# Patient Record
Sex: MALE | Race: WHITE | Employment: FULL TIME | URBAN - METROPOLITAN AREA
[De-identification: names, ages, dates, MRNs, and addresses within clinical notes are randomized per-mention and may not be internally consistent; named-entity substitution may affect disease eponyms.]

---

## 2017-05-09 ENCOUNTER — OFFICE VISIT (OUTPATIENT)
Dept: FAMILY MEDICINE CLINIC | Facility: CLINIC | Age: 42
End: 2017-05-09

## 2017-05-09 VITALS
WEIGHT: 144 LBS | RESPIRATION RATE: 16 BRPM | BODY MASS INDEX: 21.82 KG/M2 | HEIGHT: 68 IN | HEART RATE: 72 BPM | DIASTOLIC BLOOD PRESSURE: 72 MMHG | TEMPERATURE: 98 F | SYSTOLIC BLOOD PRESSURE: 118 MMHG

## 2017-05-09 DIAGNOSIS — J06.9 ACUTE URI: ICD-10-CM

## 2017-05-09 PROCEDURE — 99213 OFFICE O/P EST LOW 20 MIN: CPT | Performed by: FAMILY MEDICINE

## 2017-05-09 PROCEDURE — 99212 OFFICE O/P EST SF 10 MIN: CPT | Performed by: FAMILY MEDICINE

## 2017-05-09 RX ORDER — AZITHROMYCIN 250 MG/1
TABLET, FILM COATED ORAL
Qty: 6 TABLET | Refills: 0 | Status: SHIPPED | OUTPATIENT
Start: 2017-05-09 | End: 2017-08-21

## 2017-05-09 NOTE — PROGRESS NOTES
HPI:    Patient ID: Jeff Wells is a 46year old male. HPI Comments: Pt presents with cold symptoms for 6 days. Pt has had cough, sore throat. No fevers. Pt has tried otc remedies without relief. Pt states sick contacts at home.  Pt is planning on moist.   Neck: Neck supple. Cardiovascular: Normal rate, regular rhythm, normal heart sounds and intact distal pulses. Pulmonary/Chest: Effort normal and breath sounds normal. No respiratory distress. He has no wheezes. He has no rales.    Catalino Ling

## 2017-08-21 ENCOUNTER — HOSPITAL ENCOUNTER (OUTPATIENT)
Age: 42
Discharge: HOME | End: 2017-08-21
Attending: EMERGENCY MEDICINE
Payer: COMMERCIAL

## 2017-08-21 VITALS
DIASTOLIC BLOOD PRESSURE: 62 MMHG | HEART RATE: 73 BPM | WEIGHT: 138 LBS | RESPIRATION RATE: 18 BRPM | OXYGEN SATURATION: 97 % | BODY MASS INDEX: 20.44 KG/M2 | HEIGHT: 69 IN | SYSTOLIC BLOOD PRESSURE: 104 MMHG | TEMPERATURE: 98 F

## 2017-08-21 DIAGNOSIS — R21 SKIN RASH: Primary | ICD-10-CM

## 2017-08-21 PROCEDURE — 99213 OFFICE O/P EST LOW 20 MIN: CPT

## 2017-08-21 PROCEDURE — 99214 OFFICE O/P EST MOD 30 MIN: CPT

## 2017-08-21 RX ORDER — METHYLPREDNISOLONE 4 MG/1
TABLET ORAL
Qty: 1 PACKAGE | Refills: 1 | Status: SHIPPED | OUTPATIENT
Start: 2017-08-21 | End: 2017-08-26

## 2017-08-21 RX ORDER — MELOXICAM 7.5 MG/1
TABLET ORAL
COMMUNITY
Start: 2017-07-28 | End: 2020-12-21 | Stop reason: DRUGHIGH

## 2017-08-21 NOTE — ED PROVIDER NOTES
Patient Seen in: [facility]    History   Patient presents with:  Rash Skin Problem (integumentary)    Stated Complaint: allergic reaction bilateral lower legs    HPI    This patient complains of a rash to his legs which h °F (36.7 °C)  Temp src: Oral  SpO2: 97 %  O2 Device: None (Room air)    Current:/62   Pulse 73   Temp 98.1 °F (36.7 °C) (Oral)   Resp 18   Ht 175.3 cm (5' 9\")   Wt 62.6 kg   SpO2 97%   BMI 20.38 kg/m²         Physical Exam    The patient is awake an

## 2017-09-11 ENCOUNTER — TELEPHONE (OUTPATIENT)
Dept: FAMILY MEDICINE CLINIC | Facility: CLINIC | Age: 42
End: 2017-09-11

## 2017-09-11 NOTE — TELEPHONE ENCOUNTER
Advised patient of Dr. Angela Nash note below. Patient verbalized understanding. Patient stating he canceled apt today because ortho was able to get him in sooner and he will see ortho tomorrow.

## 2017-09-11 NOTE — TELEPHONE ENCOUNTER
Pt made appt with [facility] this morning at 11:30a  Requesting callback prior to appt if [facility] can do cortisone injection at appt? Said appt with ortho not for 2 weeks.

## 2018-05-28 ENCOUNTER — HOSPITAL ENCOUNTER (OUTPATIENT)
Age: 43
Discharge: HOME | End: 2018-05-28
Attending: EMERGENCY MEDICINE
Payer: COMMERCIAL

## 2018-05-28 VITALS
HEIGHT: 69 IN | DIASTOLIC BLOOD PRESSURE: 71 MMHG | OXYGEN SATURATION: 98 % | HEART RATE: 70 BPM | TEMPERATURE: 98 F | WEIGHT: 145 LBS | SYSTOLIC BLOOD PRESSURE: 99 MMHG | RESPIRATION RATE: 20 BRPM | BODY MASS INDEX: 21.48 KG/M2

## 2018-05-28 DIAGNOSIS — J02.0 STREP PHARYNGITIS: Primary | ICD-10-CM

## 2018-05-28 PROCEDURE — 99214 OFFICE O/P EST MOD 30 MIN: CPT

## 2018-05-28 PROCEDURE — 99213 OFFICE O/P EST LOW 20 MIN: CPT

## 2018-05-28 PROCEDURE — 87430 STREP A AG IA: CPT

## 2018-05-28 RX ORDER — AMOXICILLIN 875 MG/1
875 TABLET, COATED ORAL 2 TIMES DAILY
Qty: 20 TABLET | Refills: 0 | Status: SHIPPED | OUTPATIENT
Start: 2018-05-28 | End: 2018-06-07

## 2018-05-28 NOTE — ED PROVIDER NOTES
Patient Seen in: [facility]    History   Patient presents with:  Sore Throat    Stated Complaint: sore throat    HPI    The patient  complains of a sore throat which he has had for the past day associated with left-sided there are no gross cranial nerve deficits patient moves all 4 extremities without impairment    icc  Course     Labs Reviewed   [facility] POCT RAPID STREP - Abnormal; Notable for the following:        Result Value    POCT Rapid Strep Positive (*)     All other c

## 2018-06-03 ENCOUNTER — HOSPITAL ENCOUNTER (OUTPATIENT)
Age: 43
Discharge: HOME | End: 2018-06-03
Attending: EMERGENCY MEDICINE
Payer: COMMERCIAL

## 2018-06-03 ENCOUNTER — APPOINTMENT (OUTPATIENT)
Dept: GENERAL RADIOLOGY | Age: 43
End: 2018-06-03
Attending: EMERGENCY MEDICINE
Payer: COMMERCIAL

## 2018-06-03 VITALS
WEIGHT: 140 LBS | HEIGHT: 69 IN | DIASTOLIC BLOOD PRESSURE: 72 MMHG | SYSTOLIC BLOOD PRESSURE: 104 MMHG | RESPIRATION RATE: 18 BRPM | OXYGEN SATURATION: 99 % | HEART RATE: 80 BPM | BODY MASS INDEX: 20.73 KG/M2 | TEMPERATURE: 98 F

## 2018-06-03 DIAGNOSIS — R05.9 COUGH: Primary | ICD-10-CM

## 2018-06-03 PROCEDURE — 99213 OFFICE O/P EST LOW 20 MIN: CPT

## 2018-06-03 PROCEDURE — 71046 X-RAY EXAM CHEST 2 VIEWS: CPT | Performed by: EMERGENCY MEDICINE

## 2018-06-03 PROCEDURE — 99214 OFFICE O/P EST MOD 30 MIN: CPT

## 2018-06-03 RX ORDER — CEFDINIR 300 MG/1
300 CAPSULE ORAL 2 TIMES DAILY
Qty: 20 CAPSULE | Refills: 0 | Status: SHIPPED | OUTPATIENT
Start: 2018-06-03 | End: 2018-06-13

## 2018-06-03 NOTE — ED PROVIDER NOTES
Patient Seen in: [facility]    History   Patient presents with:  Cough/URI    Stated Complaint: cough    HPI    Patient was seen here May 28  with complaint of sore throat. He had a positive rapid strep screen.   The ghislaine throat there is no pharyngeal erythema or exudate  Neck is supple without swelling  Lungs are clear to auscultation  Cardiac there are normal first and second heart sounds  Abdomen is soft and nontender  Extremities no edema  Neurologic there are no gross

## 2018-06-03 NOTE — ED INITIAL ASSESSMENT (HPI)
Pt states he was recently diagnosed with strep throat. Currently taking antibiotic. States throat feels better but has developed a dry cough with chest congestion and occasional shortness of breath. Denies fever.

## 2019-10-16 ENCOUNTER — HOSPITAL ENCOUNTER (OUTPATIENT)
Age: 44
Discharge: HOME | End: 2019-10-16
Payer: COMMERCIAL

## 2019-10-16 VITALS
RESPIRATION RATE: 20 BRPM | TEMPERATURE: 98 F | OXYGEN SATURATION: 96 % | WEIGHT: 140 LBS | DIASTOLIC BLOOD PRESSURE: 82 MMHG | HEIGHT: 69 IN | SYSTOLIC BLOOD PRESSURE: 112 MMHG | BODY MASS INDEX: 20.73 KG/M2 | HEART RATE: 65 BPM

## 2019-10-16 DIAGNOSIS — H65.92 LEFT NON-SUPPURATIVE OTITIS MEDIA: Primary | ICD-10-CM

## 2019-10-16 PROCEDURE — 99214 OFFICE O/P EST MOD 30 MIN: CPT

## 2019-10-16 PROCEDURE — 99213 OFFICE O/P EST LOW 20 MIN: CPT

## 2019-10-16 RX ORDER — AMOXICILLIN 400 MG/5ML
800 POWDER, FOR SUSPENSION ORAL 2 TIMES DAILY
Qty: 200 ML | Refills: 0 | Status: SHIPPED | OUTPATIENT
Start: 2019-10-16 | End: 2019-10-26

## 2019-10-16 NOTE — ED INITIAL ASSESSMENT (HPI)
PATIENT ARRIVED AMBULATORY TO ROOM C/O LEFT EAR DISCOMFORT. PATIENT STATES \"I'VE HAD A HOLE IN MY EAR DRUM SINCE I WAS LITTLE. JUMA HAD IT SURGICALLY REPAIRED A COUPLE OF TIMES BUT WE STOPPED DOING ANYTHING FOR IT.  I WAS AT A WATER PARK THIS WEEKEND AND GO

## 2019-10-16 NOTE — ED PROVIDER NOTES
Patient presents with:  Ear Problem      HPI:     Tom Callejas is a 45year old male who presents with a chief complaint of left ear pain for the past 3 days. Patient states he was at a water park and thinks he may have gotten some water in it.   No d Intimate partner violence:        Fear of current or ex partner: Not on file        Emotionally abused: Not on file        Physically abused: Not on file        Forced sexual activity: Not on file    Other Topics      Concerns:         Service: N Encounter      Amoxicillin 400 MG/5ML Oral Recon Susp          Sig: Take 10 mL (800 mg total) by mouth 2 (two) times daily for 10 days.           Dispense:  200 mL          Refill:  0      Labs performed this visit:  No results found for this or any previou

## 2020-12-21 ENCOUNTER — OFFICE VISIT (OUTPATIENT)
Dept: NEUROLOGY | Facility: CLINIC | Age: 45
End: 2020-12-21
Payer: COMMERCIAL

## 2020-12-21 ENCOUNTER — TELEPHONE (OUTPATIENT)
Dept: NEUROLOGY | Facility: CLINIC | Age: 45
End: 2020-12-21

## 2020-12-21 VITALS — BODY MASS INDEX: 21.03 KG/M2 | WEIGHT: 142 LBS | HEIGHT: 69 IN

## 2020-12-21 DIAGNOSIS — M25.551 RIGHT HIP PAIN: Primary | ICD-10-CM

## 2020-12-21 PROCEDURE — 99214 OFFICE O/P EST MOD 30 MIN: CPT | Performed by: PHYSICAL MEDICINE & REHABILITATION

## 2020-12-21 PROCEDURE — 3008F BODY MASS INDEX DOCD: CPT | Performed by: PHYSICAL MEDICINE & REHABILITATION

## 2020-12-21 RX ORDER — HYDROCODONE BITARTRATE AND ACETAMINOPHEN 5; 325 MG/1; MG/1
TABLET ORAL
COMMUNITY
Start: 2020-12-16 | End: 2020-12-21

## 2020-12-21 RX ORDER — HYDROCODONE BITARTRATE AND ACETAMINOPHEN 5; 325 MG/1; MG/1
1 TABLET ORAL EVERY 6 HOURS PRN
Qty: 60 TABLET | Refills: 0 | Status: SHIPPED | OUTPATIENT
Start: 2020-12-21 | End: 2021-01-05

## 2020-12-21 RX ORDER — METHYLPREDNISOLONE 4 MG/1
TABLET ORAL
COMMUNITY
Start: 2020-12-16 | End: 2021-03-17 | Stop reason: ALTCHOICE

## 2020-12-21 RX ORDER — MELOXICAM 15 MG/1
15 TABLET ORAL DAILY
Qty: 30 TABLET | Refills: 0 | Status: SHIPPED | OUTPATIENT
Start: 2020-12-21 | End: 2021-03-17

## 2020-12-21 NOTE — TELEPHONE ENCOUNTER
Contacted Stefan Lebron at [facility] # [id number] to initiate authorization for right Hip MRI Arthrogram CPT 21230 dx:M25.551  Coverage is active    Status: Approved-no authorization required per health plan    Left detailed message informing p

## 2020-12-21 NOTE — PROGRESS NOTES
[address]  Progress Note    CHIEF COMPLAINT:  Patient presents with:  Leg Pain: Former patient of Dr. Jayleen Villegas presents for pain in the right groin and leg.  Patient states that pain started about 3 weeks ago a HISTORY:   Family History   Problem Relation Age of Onset   • Other (Other[other]) Father         mesotheolioma   • Breast Cancer Father        CURRENT MEDICATIONS:   Current Outpatient Medications   Medication Sig Dispense Refill   • methylPREDNISolone 4 denies  Balance: denies   Psychiatric  Anxiety: denies  Depressed Mood: denies          All other systems reviewed and are negative. Pertinent positives and negatives noted in the HPI.         PHYSICAL EXAM:   Ht 69\"   Wt 142 lb (64.4 kg)   BMI 20.97 kg/m² There were no barriers to learning.         Aleksey Calabrese MD  Physical Medicine and Rehabilitation/Sports Medicine  [facility]

## 2020-12-22 ENCOUNTER — TELEPHONE (OUTPATIENT)
Dept: NEUROLOGY | Facility: CLINIC | Age: 45
End: 2020-12-22

## 2020-12-22 NOTE — TELEPHONE ENCOUNTER
Tried calling to initiate PA but they were having technical difficulties at this time. Will try again at a later time.

## 2020-12-23 NOTE — TELEPHONE ENCOUNTER
Called member eligibility for prior authorization on hydrocodone. Gave information over the phone. Will have turn around time of 48 hours. Unable to give expedited turn around time. Reference # [id number].      Spoke to patient, advised completed prior autho

## 2020-12-29 ENCOUNTER — HOSPITAL ENCOUNTER (OUTPATIENT)
Dept: MRI IMAGING | Facility: HOSPITAL | Age: 45
Discharge: HOME | End: 2020-12-29
Attending: PHYSICAL MEDICINE & REHABILITATION
Payer: COMMERCIAL

## 2020-12-29 ENCOUNTER — HOSPITAL ENCOUNTER (OUTPATIENT)
Dept: GENERAL RADIOLOGY | Facility: HOSPITAL | Age: 45
Discharge: HOME | End: 2020-12-29
Attending: PHYSICAL MEDICINE & REHABILITATION
Payer: COMMERCIAL

## 2020-12-29 ENCOUNTER — TELEPHONE (OUTPATIENT)
Dept: NEUROLOGY | Facility: CLINIC | Age: 45
End: 2020-12-29

## 2020-12-29 DIAGNOSIS — R93.89 ABNORMAL RADIOGRAPH: Primary | ICD-10-CM

## 2020-12-29 DIAGNOSIS — M25.551 RIGHT HIP PAIN: ICD-10-CM

## 2020-12-29 DIAGNOSIS — M53.3 SACRAL PAIN: ICD-10-CM

## 2020-12-29 PROCEDURE — 73722 MRI JOINT OF LWR EXTR W/DYE: CPT | Performed by: PHYSICAL MEDICINE & REHABILITATION

## 2020-12-29 PROCEDURE — A9575 INJ GADOTERATE MEGLUMI 0.1ML: HCPCS | Performed by: PHYSICAL MEDICINE & REHABILITATION

## 2020-12-29 PROCEDURE — 27093 INJECTION FOR HIP X-RAY: CPT | Performed by: PHYSICAL MEDICINE & REHABILITATION

## 2020-12-29 PROCEDURE — 77002 NEEDLE LOCALIZATION BY XRAY: CPT | Performed by: PHYSICAL MEDICINE & REHABILITATION

## 2020-12-29 RX ORDER — LIDOCAINE HYDROCHLORIDE 20 MG/ML
INJECTION, SOLUTION EPIDURAL; INFILTRATION; INTRACAUDAL; PERINEURAL
Status: COMPLETED
Start: 2020-12-29 | End: 2020-12-29

## 2020-12-29 RX ADMIN — LIDOCAINE HYDROCHLORIDE 5 ML: 20 INJECTION, SOLUTION EPIDURAL; INFILTRATION; INTRACAUDAL; PERINEURAL at 09:50:00

## 2020-12-29 NOTE — TELEPHONE ENCOUNTER
Pls let the patient know his hip looks normal. The radiologist saw some swelling in the pelvic bone and recommended a pelvic MRI. Stress fracture? I have put the order in and I recommend he get that.

## 2020-12-29 NOTE — TELEPHONE ENCOUNTER
Patient notified of the imaging results as documented by Dr. Billy Granado. Patient verbalized understanding and was transferred to [address] [place] [address] scheduling as the Pelvis MRI does not required authorization per health plan and can be scheduled.

## 2020-12-29 NOTE — TELEPHONE ENCOUNTER
Alvaro Harris at [facility] Case/Reference # [id number] to initiate authorization for Pelvis MRI CPT 81547 dx:R93.89+M53.3.     Status: Approved-no authorization required per plan  Subject to medical review once billed  Coverage is active    Patient n

## 2020-12-30 ENCOUNTER — HOSPITAL ENCOUNTER (OUTPATIENT)
Dept: MRI IMAGING | Age: 45
Discharge: HOME | End: 2020-12-30
Attending: PHYSICAL MEDICINE & REHABILITATION
Payer: COMMERCIAL

## 2020-12-30 ENCOUNTER — TELEPHONE (OUTPATIENT)
Dept: NEUROLOGY | Facility: CLINIC | Age: 45
End: 2020-12-30

## 2020-12-30 DIAGNOSIS — M46.1 SACROILIITIS (HCC): Primary | ICD-10-CM

## 2020-12-30 DIAGNOSIS — R93.89 ABNORMAL RADIOGRAPH: ICD-10-CM

## 2020-12-30 DIAGNOSIS — M53.3 SACRAL PAIN: ICD-10-CM

## 2020-12-30 PROCEDURE — 72195 MRI PELVIS W/O DYE: CPT | Performed by: PHYSICAL MEDICINE & REHABILITATION

## 2020-12-30 NOTE — TELEPHONE ENCOUNTER
I discussed the MRI results with the patient. His pelvic MRI shows swelling within the sacroiliac joint. There is some fluid within the joint which either indicates joint swelling or possibly infection.   This is unusual and I will order some blood tests

## 2020-12-31 ENCOUNTER — LAB ENCOUNTER (OUTPATIENT)
Dept: LAB | Age: 45
End: 2020-12-31
Attending: PHYSICAL MEDICINE & REHABILITATION
Payer: COMMERCIAL

## 2021-01-04 ENCOUNTER — LAB ENCOUNTER (OUTPATIENT)
Dept: LAB | Age: 46
End: 2021-01-04
Attending: PHYSICAL MEDICINE & REHABILITATION
Payer: COMMERCIAL

## 2021-01-04 DIAGNOSIS — M46.1 SACROILIITIS (HCC): ICD-10-CM

## 2021-01-04 LAB
BASOPHILS # BLD AUTO: 0.05 X10(3) UL (ref 0–0.2)
BASOPHILS NFR BLD AUTO: 0.9 %
CRP SERPL-MCNC: <0.29 MG/DL (ref ?–0.3)
DEPRECATED RDW RBC AUTO: 41.6 FL (ref 35.1–46.3)
EOSINOPHIL # BLD AUTO: 0.24 X10(3) UL (ref 0–0.7)
EOSINOPHIL NFR BLD AUTO: 4.2 %
ERYTHROCYTE [DISTWIDTH] IN BLOOD BY AUTOMATED COUNT: 12.6 % (ref 11–15)
ERYTHROCYTE [SEDIMENTATION RATE] IN BLOOD: 11 MM/HR
HCT VFR BLD AUTO: 45.6 %
HGB BLD-MCNC: 15 G/DL
IMM GRANULOCYTES # BLD AUTO: 0.02 X10(3) UL (ref 0–1)
IMM GRANULOCYTES NFR BLD: 0.3 %
LYMPHOCYTES # BLD AUTO: 2.54 X10(3) UL (ref 1–4)
LYMPHOCYTES NFR BLD AUTO: 43.9 %
MCH RBC QN AUTO: 30.1 PG (ref 26–34)
MCHC RBC AUTO-ENTMCNC: 32.9 G/DL (ref 31–37)
MCV RBC AUTO: 91.4 FL
MONOCYTES # BLD AUTO: 0.54 X10(3) UL (ref 0.1–1)
MONOCYTES NFR BLD AUTO: 9.3 %
NEUTROPHILS # BLD AUTO: 2.39 X10 (3) UL (ref 1.5–7.7)
NEUTROPHILS # BLD AUTO: 2.39 X10(3) UL (ref 1.5–7.7)
NEUTROPHILS NFR BLD AUTO: 41.4 %
PLATELET # BLD AUTO: 211 10(3)UL (ref 150–450)
RBC # BLD AUTO: 4.99 X10(6)UL
WBC # BLD AUTO: 5.8 X10(3) UL (ref 4–11)

## 2021-01-04 PROCEDURE — 36415 COLL VENOUS BLD VENIPUNCTURE: CPT

## 2021-01-04 PROCEDURE — 85652 RBC SED RATE AUTOMATED: CPT

## 2021-01-04 PROCEDURE — 86812 HLA TYPING A B OR C: CPT

## 2021-01-04 PROCEDURE — 86038 ANTINUCLEAR ANTIBODIES: CPT

## 2021-01-04 PROCEDURE — 85025 COMPLETE CBC W/AUTO DIFF WBC: CPT

## 2021-01-04 PROCEDURE — 86140 C-REACTIVE PROTEIN: CPT | Performed by: PHYSICAL MEDICINE & REHABILITATION

## 2021-01-05 LAB — HLA-B27: POSITIVE

## 2021-01-07 ENCOUNTER — TELEPHONE (OUTPATIENT)
Dept: NEUROLOGY | Facility: CLINIC | Age: 46
End: 2021-01-07

## 2021-01-07 DIAGNOSIS — M46.1 SACROILIITIS (HCC): Primary | ICD-10-CM

## 2021-01-07 DIAGNOSIS — M53.3 SACRAL PAIN: ICD-10-CM

## 2021-01-07 LAB — NUCLEAR IGG TITR SER IF: NEGATIVE {TITER}

## 2021-01-07 NOTE — TELEPHONE ENCOUNTER
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 I have 2 recommendations:   1.   I would like him to see one of the rheumatologists, JUAN MIGUEL

## 2021-01-07 NOTE — TELEPHONE ENCOUNTER
Informed patient of imaging results and recommendation per Dr. Hobson. Gave patient Dr. Latesha Padilla and Dr. Maira Yousif information. Patient states he would like to proceed with SI Joint injection. Injection pended.

## 2021-01-07 NOTE — TELEPHONE ENCOUNTER
Contacted [facility] to initiate authorization for right sacroiliac joint injection under fluoroscopy CPT 59134 dx:M46. 1+M53.3 to be done at [facility].   Coverage is active  Status: pending-Crystal is currently having technical difficulties    Will call back later

## 2021-01-08 NOTE — TELEPHONE ENCOUNTER
Patient has been scheduled for a right sacroiliac joint injection under fluoroscopy, local  on 01/14/2021 at the [facility]. Medications and allergies reviewed. Patient informed he will need a .  Patient informed not to eat or drink anything after midnight

## 2021-01-08 NOTE — TELEPHONE ENCOUNTER
Edward ACEVEDO at [facility] reference # [id number] to initiate authorization for right sacroiliac joint injection CPT 99510 dx: M46.1+M53.3.   Status: Approved-no authorization required per health plan    Clinical staff can proceed with scheduling at [facility]

## 2021-01-12 ENCOUNTER — HOSPITAL ENCOUNTER (OUTPATIENT)
Dept: GENERAL RADIOLOGY | Age: 46
Discharge: HOME | End: 2021-01-12
Attending: INTERNAL MEDICINE
Payer: COMMERCIAL

## 2021-01-12 ENCOUNTER — OFFICE VISIT (OUTPATIENT)
Dept: RHEUMATOLOGY | Facility: CLINIC | Age: 46
End: 2021-01-12
Payer: COMMERCIAL

## 2021-01-12 VITALS
HEART RATE: 62 BPM | DIASTOLIC BLOOD PRESSURE: 76 MMHG | SYSTOLIC BLOOD PRESSURE: 113 MMHG | HEIGHT: 69 IN | WEIGHT: 145.69 LBS | BODY MASS INDEX: 21.58 KG/M2

## 2021-01-12 DIAGNOSIS — M54.9 CHRONIC BACK PAIN, UNSPECIFIED BACK LOCATION, UNSPECIFIED BACK PAIN LATERALITY: ICD-10-CM

## 2021-01-12 DIAGNOSIS — M46.1 SACROILIITIS (HCC): ICD-10-CM

## 2021-01-12 DIAGNOSIS — M46.1 SACROILIITIS (HCC): Primary | ICD-10-CM

## 2021-01-12 DIAGNOSIS — G89.29 CHRONIC BACK PAIN, UNSPECIFIED BACK LOCATION, UNSPECIFIED BACK PAIN LATERALITY: ICD-10-CM

## 2021-01-12 PROCEDURE — 72110 X-RAY EXAM L-2 SPINE 4/>VWS: CPT | Performed by: INTERNAL MEDICINE

## 2021-01-12 PROCEDURE — 3078F DIAST BP <80 MM HG: CPT | Performed by: INTERNAL MEDICINE

## 2021-01-12 PROCEDURE — 72072 X-RAY EXAM THORAC SPINE 3VWS: CPT | Performed by: INTERNAL MEDICINE

## 2021-01-12 PROCEDURE — 99244 OFF/OP CNSLTJ NEW/EST MOD 40: CPT | Performed by: INTERNAL MEDICINE

## 2021-01-12 PROCEDURE — 3008F BODY MASS INDEX DOCD: CPT | Performed by: INTERNAL MEDICINE

## 2021-01-12 PROCEDURE — 3074F SYST BP LT 130 MM HG: CPT | Performed by: INTERNAL MEDICINE

## 2021-01-12 NOTE — PROGRESS NOTES
Dear Tena Cooks:    I saw your patient Leslie Jaeger in consultation this afternoon at your request for evaluation of possible ankylosing spondylitis. As you know, he is a 29-year-old gentleman who has had chronic right foot problems.   He had a stress reacti showing right sacroiliitis, consistent with being posttraumatic, from infection, or ankylosing spondylitis. January 4th of 2021, CBC, C-reactive protein less than 0.29, REESE, sed rate of 11 were normal.  HLA-B27 was positive.     Past medical history:  He h posttraumatic, given his ultra distance running. He is HLA-B27 positive. Of Caucasians who are HLA-B27 positive, 5% actually have ankylosing spondylitis.     If he had ankylosing spondylitis I would expect that there would be other changes in his lumbar a

## 2021-01-14 ENCOUNTER — OFFICE VISIT (OUTPATIENT)
Dept: SURGERY | Facility: CLINIC | Age: 46
End: 2021-01-14

## 2021-01-14 ENCOUNTER — TELEPHONE (OUTPATIENT)
Dept: NEUROLOGY | Facility: CLINIC | Age: 46
End: 2021-01-14

## 2021-01-14 DIAGNOSIS — M46.1 SACROILIITIS (HCC): Primary | ICD-10-CM

## 2021-01-14 PROCEDURE — 27096 INJECT SACROILIAC JOINT: CPT | Performed by: PHYSICAL MEDICINE & REHABILITATION

## 2021-01-14 NOTE — PROCEDURES
Preoperative Diagnosis:  (M46.1) Sacroiliitis (HCC)  (primary encounter diagnosis)       Postoperative Diagnosis:  (M46.1) Sacroiliitis (HCC)  (primary encounter diagnosis)       Procedures:  Right sacroiliac injection(s) under fluoroscopic guidance and

## 2021-03-10 ENCOUNTER — HOSPITAL ENCOUNTER (OUTPATIENT)
Age: 46
Discharge: HOME | End: 2021-03-10
Payer: COMMERCIAL

## 2021-03-10 VITALS
DIASTOLIC BLOOD PRESSURE: 81 MMHG | TEMPERATURE: 98 F | RESPIRATION RATE: 20 BRPM | OXYGEN SATURATION: 99 % | SYSTOLIC BLOOD PRESSURE: 110 MMHG | HEART RATE: 60 BPM

## 2021-03-10 DIAGNOSIS — J02.0 STREP PHARYNGITIS: Primary | ICD-10-CM

## 2021-03-10 LAB — S PYO AG THROAT QL: POSITIVE

## 2021-03-10 PROCEDURE — 87880 STREP A ASSAY W/OPTIC: CPT

## 2021-03-10 PROCEDURE — 99213 OFFICE O/P EST LOW 20 MIN: CPT

## 2021-03-10 RX ORDER — AMOXICILLIN 500 MG/1
500 TABLET, FILM COATED ORAL 2 TIMES DAILY
Qty: 14 TABLET | Refills: 0 | Status: SHIPPED | OUTPATIENT
Start: 2021-03-10 | End: 2021-03-17

## 2021-03-10 NOTE — ED PROVIDER NOTES
Patient Seen in: [facility]      History   Patient presents with:  Sore Throat    Stated Complaint: sore throat    HPI/Subjective:   HPI    40 yo male here for evaluation of sore throat. Pt reports symptoms started 1-2 days ago.   He denies f Musculoskeletal:         General: Normal range of motion. Cervical back: Normal range of motion. Skin:     General: Skin is warm. Neurological:      General: No focal deficit present.       Mental Status: He is alert and oriented to person, place

## 2021-03-17 ENCOUNTER — OFFICE VISIT (OUTPATIENT)
Dept: NEUROLOGY | Facility: CLINIC | Age: 46
End: 2021-03-17
Payer: COMMERCIAL

## 2021-03-17 ENCOUNTER — TELEPHONE (OUTPATIENT)
Dept: NEUROLOGY | Facility: CLINIC | Age: 46
End: 2021-03-17

## 2021-03-17 VITALS — WEIGHT: 140 LBS | BODY MASS INDEX: 20.73 KG/M2 | HEIGHT: 69 IN

## 2021-03-17 DIAGNOSIS — M46.1 SACROILIITIS (HCC): Primary | ICD-10-CM

## 2021-03-17 PROCEDURE — 3008F BODY MASS INDEX DOCD: CPT | Performed by: PHYSICAL MEDICINE & REHABILITATION

## 2021-03-17 PROCEDURE — 99214 OFFICE O/P EST MOD 30 MIN: CPT | Performed by: PHYSICAL MEDICINE & REHABILITATION

## 2021-03-17 NOTE — PROGRESS NOTES
[address]  Progress Note    CHIEF COMPLAINT:  Patient presents with:  Hip Pain: Patient presents for follow up on right SI injection done 01/14/21 Reports that symptoms have returned.        History of Prese Heartbeat: denies   Gastrointestinal  Bowel Incontinence: denies  Heartburn: denies   Genitourinary  Difficulty Urinating: denies  Bladder Incontinence: denies   Musculoskeletal  Joint Stiffness: admits  Painful Joints: admits   Neurological  Loss of Stren were placed in this encounter. RTC    Return for 2 week post injection follow up. Discharge Instructions were provided as documented in AVS summary. The patient was in agreement with the assessment and plan. All questions were answered.   There

## 2021-03-17 NOTE — TELEPHONE ENCOUNTER
Called [facility] for authorization of approval of Right sacroiliac injection cpt codes 90381. Talked to Kailyn D. Authorization is not required. Reference #  [id number]. Will inform Nursing.

## 2021-03-18 NOTE — TELEPHONE ENCOUNTER
Patient has been scheduled for a Right sacroiliac injection  on 03/25/21 at the [address] [place] [address]. Medications and allergies reviewed. Patient informed to hold , nsaids, multivitamins, vitamin E and fish oils 3-7 days prior to procedure.  Patient informed HE will need

## 2021-03-25 ENCOUNTER — OFFICE VISIT (OUTPATIENT)
Dept: SURGERY | Facility: CLINIC | Age: 46
End: 2021-03-25

## 2021-03-25 DIAGNOSIS — M46.1 SACROILIITIS (HCC): Primary | ICD-10-CM

## 2021-03-25 PROCEDURE — 27096 INJECT SACROILIAC JOINT: CPT | Performed by: PHYSICAL MEDICINE & REHABILITATION

## 2021-04-16 ENCOUNTER — HOSPITAL ENCOUNTER (OUTPATIENT)
Age: 46
Discharge: HOME | End: 2021-04-16
Payer: COMMERCIAL

## 2021-04-16 VITALS
TEMPERATURE: 99 F | DIASTOLIC BLOOD PRESSURE: 78 MMHG | HEART RATE: 78 BPM | RESPIRATION RATE: 18 BRPM | SYSTOLIC BLOOD PRESSURE: 125 MMHG | OXYGEN SATURATION: 100 %

## 2021-04-16 DIAGNOSIS — L03.012 CELLULITIS OF FINGER OF LEFT HAND: Primary | ICD-10-CM

## 2021-04-16 PROCEDURE — 99213 OFFICE O/P EST LOW 20 MIN: CPT | Performed by: NURSE PRACTITIONER

## 2021-04-16 RX ORDER — CEPHALEXIN 500 MG/1
500 CAPSULE ORAL 4 TIMES DAILY
Qty: 40 CAPSULE | Refills: 0 | Status: SHIPPED | OUTPATIENT
Start: 2021-04-16 | End: 2021-04-26

## 2021-04-16 NOTE — ED PROVIDER NOTES
Patient Seen in: [facility]      History   Patient presents with:  Skin    Stated Complaint: finger laceration    HPI/Subjective:   HPI    This is a well-appearing 19-year-old male who presents with a chief complaint of a puncture wound to th external ear normal.      Left Ear: Tympanic membrane, ear canal and external ear normal.      Nose: Nose normal.      Mouth/Throat:      Mouth: Mucous membranes are moist.      Pharynx: Oropharynx is clear. Uvula midline.    Eyes:      General: Lids are no limited to OP/IP visits, radiology tests , clinical labs tests, EKG's, and medication. I Updated patient  on all findings, who verbalized understanding and agreement with the plan.  I explained to the patient that emergent conditions may arise and to go t

## 2021-04-27 ENCOUNTER — OFFICE VISIT (OUTPATIENT)
Dept: NEUROLOGY | Facility: CLINIC | Age: 46
End: 2021-04-27
Payer: COMMERCIAL

## 2021-04-27 VITALS — WEIGHT: 140 LBS | HEIGHT: 69 IN | BODY MASS INDEX: 20.73 KG/M2

## 2021-04-27 DIAGNOSIS — M46.1 SACROILIITIS (HCC): Primary | ICD-10-CM

## 2021-04-27 DIAGNOSIS — R12 HEARTBURN: ICD-10-CM

## 2021-04-27 PROCEDURE — 99213 OFFICE O/P EST LOW 20 MIN: CPT | Performed by: PHYSICAL MEDICINE & REHABILITATION

## 2021-04-27 PROCEDURE — 3008F BODY MASS INDEX DOCD: CPT | Performed by: PHYSICAL MEDICINE & REHABILITATION

## 2021-04-27 NOTE — PROGRESS NOTES
[address]  Progress Note    CHIEF COMPLAINT:  Patient presents with:  Low Back Pain: Patient presents for post SI joint injections. Patient reports 30-40 % improvement.  Rates pain 3/10      History of Prese 69\"   Wt 140 lb (63.5 kg)   BMI 20.67 kg/m²     Body mass index is 20.67 kg/m².       General: No immediate distress  Extremities: No lower extremity edema bilaterally   Spine: full and painfree lumbar ROM in all directions  Hips: full and painfree ROM   N

## (undated) NOTE — LETTER
[address] [address]   Date:   12/21/2020     Name:   Miller Toledo    YOB: 1975   MRN:   [id number]       WHERE IS YOUR PAIN NOW? Johnny the areas on your body where you feel the described sensations.

## (undated) NOTE — LETTER
[address]   Date:   4/27/2021     Name:   Corrine Weeks    YOB: 1975   MRN:   [id number]       WHERE IS YOUR PAIN NOW? Johnny the areas on your body where you feel the described sensations.   Use the appropriate s

## (undated) NOTE — MR AVS SNAPSHOT
[facility] SPECIAL[facility] - [address] [address] [address] [id number] [phone]               Thank you for choosing us for your health care visit with Alfredo Feliciano MD.  We are glad to serve you and happy to provide you with this summary of y Where to Get Your Medications      These medications were sent to [address] [address] [facility], [place] - [address], [phone], [phone] [address] [place], [address] [facility] [address]     Phone

## (undated) NOTE — LETTER
[address]   Date:   3/17/2021     Name:   Diana Canchola    YOB: 1975   MRN:   [id number]       WHERE IS YOUR PAIN NOW? Johnny the areas on your body where you feel the described sensations.   Use the appropriate s